# Patient Record
Sex: FEMALE | Race: WHITE | NOT HISPANIC OR LATINO
[De-identification: names, ages, dates, MRNs, and addresses within clinical notes are randomized per-mention and may not be internally consistent; named-entity substitution may affect disease eponyms.]

---

## 2021-11-24 PROBLEM — Z00.00 ENCOUNTER FOR PREVENTIVE HEALTH EXAMINATION: Status: ACTIVE | Noted: 2021-11-24

## 2021-11-29 ENCOUNTER — APPOINTMENT (OUTPATIENT)
Dept: HEART AND VASCULAR | Facility: CLINIC | Age: 66
End: 2021-11-29
Payer: MEDICARE

## 2021-11-29 ENCOUNTER — NON-APPOINTMENT (OUTPATIENT)
Age: 66
End: 2021-11-29

## 2021-11-29 VITALS
SYSTOLIC BLOOD PRESSURE: 150 MMHG | HEART RATE: 56 BPM | WEIGHT: 160 LBS | HEIGHT: 64 IN | RESPIRATION RATE: 12 BRPM | DIASTOLIC BLOOD PRESSURE: 90 MMHG | BODY MASS INDEX: 27.31 KG/M2

## 2021-11-29 DIAGNOSIS — R94.31 ABNORMAL ELECTROCARDIOGRAM [ECG] [EKG]: ICD-10-CM

## 2021-11-29 DIAGNOSIS — Z82.49 FAMILY HISTORY OF ISCHEMIC HEART DISEASE AND OTHER DISEASES OF THE CIRCULATORY SYSTEM: ICD-10-CM

## 2021-11-29 DIAGNOSIS — Z78.9 OTHER SPECIFIED HEALTH STATUS: ICD-10-CM

## 2021-11-29 DIAGNOSIS — Z86.39 PERSONAL HISTORY OF OTHER ENDOCRINE, NUTRITIONAL AND METABOLIC DISEASE: ICD-10-CM

## 2021-11-29 DIAGNOSIS — R09.89 OTHER SPECIFIED SYMPTOMS AND SIGNS INVOLVING THE CIRCULATORY AND RESPIRATORY SYSTEMS: ICD-10-CM

## 2021-11-29 PROCEDURE — 93306 TTE W/DOPPLER COMPLETE: CPT

## 2021-11-29 PROCEDURE — 99204 OFFICE O/P NEW MOD 45 MIN: CPT | Mod: 25

## 2021-11-29 PROCEDURE — 93000 ELECTROCARDIOGRAM COMPLETE: CPT

## 2021-11-29 PROCEDURE — 93880 EXTRACRANIAL BILAT STUDY: CPT

## 2021-11-29 PROCEDURE — ZZZZZ: CPT

## 2021-11-29 RX ORDER — AMOXICILLIN AND CLAVULANATE POTASSIUM 500; 125 MG/1; MG/1
500-125 TABLET, FILM COATED ORAL
Qty: 20 | Refills: 0 | Status: DISCONTINUED | COMMUNITY
Start: 2021-10-21

## 2021-11-29 RX ORDER — LEVOFLOXACIN 500 MG/1
500 TABLET, FILM COATED ORAL
Qty: 10 | Refills: 0 | Status: DISCONTINUED | COMMUNITY
Start: 2021-10-28

## 2021-11-29 RX ORDER — LEVOTHYROXINE SODIUM 25 UG/1
25 TABLET ORAL
Refills: 0 | Status: DISCONTINUED | COMMUNITY

## 2021-11-29 RX ORDER — PREDNISONE 10 MG/1
10 TABLET ORAL
Qty: 40 | Refills: 0 | Status: DISCONTINUED | COMMUNITY
Start: 2021-10-28

## 2021-11-29 RX ORDER — LEVOTHYROXINE SODIUM 0.12 MG/1
125 TABLET ORAL
Qty: 90 | Refills: 0 | Status: ACTIVE | COMMUNITY
Start: 2021-10-18

## 2021-11-29 NOTE — DISCUSSION/SUMMARY
[FreeTextEntry1] : 1. Chest discomfort/abnormal EKG: EKG reviewed normal sinus rhythm with LVH and strain pattern. However, will need to rule out ischemic heart disease and this middle-aged postmenopausal female with hypothyroidism wants a chest pain. Will advise with results of an exercise nuclear stress test known.\par 2. Cardiac murmur: Echocardiogram\par 3. Carotid bruit with carotid duplex\par 4. Hypertension: Hard to believe patient has white coat hypertension as her heart rate on no AV seng blockers is low/normal. Will start antihypertensive medication at this time advised to maintain a low salt diet weight loss.

## 2021-11-29 NOTE — HISTORY OF PRESENT ILLNESS
[FreeTextEntry1] : 66-year-old female with a past medical history hypothyroidism comes in for evaluation of chest discomfort. Patient reports a few weeks of substernal nonradiating chest discomfort most notable on exertional activity relieved with rest. Does also occasionally occur at rest. No associated shortness of breath PND orthopnea.

## 2021-11-29 NOTE — PHYSICAL EXAM

## 2021-12-09 ENCOUNTER — APPOINTMENT (OUTPATIENT)
Dept: HEART AND VASCULAR | Facility: CLINIC | Age: 66
End: 2021-12-09
Payer: MEDICARE

## 2021-12-09 DIAGNOSIS — R07.89 OTHER CHEST PAIN: ICD-10-CM

## 2021-12-09 PROCEDURE — 78452 HT MUSCLE IMAGE SPECT MULT: CPT

## 2021-12-09 PROCEDURE — A9500: CPT

## 2021-12-09 PROCEDURE — 93015 CV STRESS TEST SUPVJ I&R: CPT

## 2021-12-09 PROCEDURE — 96374 THER/PROPH/DIAG INJ IV PUSH: CPT | Mod: 59

## 2022-01-03 RX ORDER — LOSARTAN POTASSIUM 25 MG/1
25 TABLET, FILM COATED ORAL DAILY
Qty: 30 | Refills: 3 | Status: DISCONTINUED | COMMUNITY
Start: 2021-11-29 | End: 2022-01-03

## 2022-06-02 ENCOUNTER — APPOINTMENT (OUTPATIENT)
Dept: HEART AND VASCULAR | Facility: CLINIC | Age: 67
End: 2022-06-02

## 2022-06-14 ENCOUNTER — APPOINTMENT (OUTPATIENT)
Dept: HEART AND VASCULAR | Facility: CLINIC | Age: 67
End: 2022-06-14
Payer: MEDICARE

## 2022-06-14 ENCOUNTER — NON-APPOINTMENT (OUTPATIENT)
Age: 67
End: 2022-06-14

## 2022-06-14 VITALS
BODY MASS INDEX: 29.02 KG/M2 | HEIGHT: 64 IN | WEIGHT: 170 LBS | RESPIRATION RATE: 12 BRPM | SYSTOLIC BLOOD PRESSURE: 124 MMHG | DIASTOLIC BLOOD PRESSURE: 70 MMHG | HEART RATE: 60 BPM

## 2022-06-14 DIAGNOSIS — I47.2 VENTRICULAR TACHYCARDIA: ICD-10-CM

## 2022-06-14 PROCEDURE — 99214 OFFICE O/P EST MOD 30 MIN: CPT

## 2022-06-14 PROCEDURE — 93000 ELECTROCARDIOGRAM COMPLETE: CPT

## 2022-06-14 NOTE — PHYSICAL EXAM

## 2022-06-14 NOTE — DISCUSSION/SUMMARY
[FreeTextEntry1] : 1. RV OT-VT: Continue metoprolol. Will place a 24-hour Holter monitor to assess for VT if present will refer her for ablation.\par 2. Hypertension: Adequately controlled on telmisartan 20 mg daily along with metoprolol 25 mg daily advised to continue maintain a low salt diet.

## 2022-06-14 NOTE — REASON FOR VISIT
Lm on vm informing pt of appt and xray 1/8/20.   [FreeTextEntry1] : 67-year-old female with a past medical history of hypertension and RV OT-VT noted on an exercise nuclear stress test. Done 6 months ago. Since then patient has been feeling well denies any chest pain shortness of breath PND orthopnea lightheadedness syncope or palpitations.

## 2023-02-20 ENCOUNTER — APPOINTMENT (OUTPATIENT)
Dept: HEART AND VASCULAR | Facility: CLINIC | Age: 68
End: 2023-02-20

## 2023-02-24 ENCOUNTER — APPOINTMENT (OUTPATIENT)
Dept: HEART AND VASCULAR | Facility: CLINIC | Age: 68
End: 2023-02-24
Payer: MEDICARE

## 2023-02-24 ENCOUNTER — NON-APPOINTMENT (OUTPATIENT)
Age: 68
End: 2023-02-24

## 2023-02-24 VITALS
SYSTOLIC BLOOD PRESSURE: 130 MMHG | DIASTOLIC BLOOD PRESSURE: 80 MMHG | HEART RATE: 66 BPM | BODY MASS INDEX: 29.18 KG/M2 | RESPIRATION RATE: 12 BRPM | WEIGHT: 170 LBS

## 2023-02-24 DIAGNOSIS — R01.1 CARDIAC MURMUR, UNSPECIFIED: ICD-10-CM

## 2023-02-24 DIAGNOSIS — I10 ESSENTIAL (PRIMARY) HYPERTENSION: ICD-10-CM

## 2023-02-24 DIAGNOSIS — I42.0 DILATED CARDIOMYOPATHY: ICD-10-CM

## 2023-02-24 PROCEDURE — 99214 OFFICE O/P EST MOD 30 MIN: CPT | Mod: 25

## 2023-02-24 PROCEDURE — 93000 ELECTROCARDIOGRAM COMPLETE: CPT

## 2023-02-24 PROCEDURE — ZZZZZ: CPT

## 2023-02-24 NOTE — PHYSICAL EXAM

## 2023-02-24 NOTE — DISCUSSION/SUMMARY
[FreeTextEntry1] : 1.  Cardiomyopathy: Etiology either coronary vasospasm and or more likely myocarditis.  We will obtain an EKG repeat echocardiogram in 6 weeks to assess for LV function.  At this time continue metoprolol and telmisartan as currently dosed.\par 2.  Hypertension: See above\par 3.  Hypothyroidism: Continue Synthroid. [EKG obtained to assist in diagnosis and management of assessed problem(s)] : EKG obtained to assist in diagnosis and management of assessed problem(s)

## 2023-02-24 NOTE — HISTORY OF PRESENT ILLNESS
[FreeTextEntry1] : 67-year-old female with a past medical history of hypertension comes in for follow-up posthospitalization.  1 week ago patient was walking developed sudden onset shortness of breath gasping for air EMS was called she was taken to the emergency room was noted to have a slight bump in her cardiac enzymes patient was subsequently given Lasix troponins peaked at 0.33 she underwent a coronary angiogram which revealed nonobstructive CAD.  Reportedly echocardiogram revealed a global cardiomyopathy EF 40 to 45%.  Since discharge patient has been more or less back to her baseline still complaining of exertional fatigue.  Of note, patient underwent a nuclear stress test in December 2021 while on the treadmill developed what appears to be accelerated idioventricular rhythm/RV OT VT.  Currently denies any palpitations.

## 2023-03-15 ENCOUNTER — TRANSCRIPTION ENCOUNTER (OUTPATIENT)
Age: 68
End: 2023-03-15

## 2023-11-13 RX ORDER — METOPROLOL SUCCINATE 25 MG/1
25 TABLET, EXTENDED RELEASE ORAL
Qty: 90 | Refills: 1 | Status: ACTIVE | COMMUNITY
Start: 2021-12-10 | End: 1900-01-01

## 2023-12-11 RX ORDER — TELMISARTAN 20 MG/1
20 TABLET ORAL
Qty: 90 | Refills: 1 | Status: ACTIVE | COMMUNITY
Start: 2022-01-03 | End: 1900-01-01

## 2024-09-30 ENCOUNTER — APPOINTMENT (OUTPATIENT)
Dept: HEART AND VASCULAR | Facility: CLINIC | Age: 69
End: 2024-09-30
Payer: MEDICARE

## 2024-09-30 ENCOUNTER — NON-APPOINTMENT (OUTPATIENT)
Age: 69
End: 2024-09-30

## 2024-09-30 VITALS
HEIGHT: 64 IN | BODY MASS INDEX: 28.17 KG/M2 | DIASTOLIC BLOOD PRESSURE: 70 MMHG | RESPIRATION RATE: 12 BRPM | SYSTOLIC BLOOD PRESSURE: 150 MMHG | HEART RATE: 60 BPM | OXYGEN SATURATION: 99 % | WEIGHT: 165 LBS

## 2024-09-30 DIAGNOSIS — R09.89 OTHER SPECIFIED SYMPTOMS AND SIGNS INVOLVING THE CIRCULATORY AND RESPIRATORY SYSTEMS: ICD-10-CM

## 2024-09-30 DIAGNOSIS — I42.0 DILATED CARDIOMYOPATHY: ICD-10-CM

## 2024-09-30 DIAGNOSIS — I10 ESSENTIAL (PRIMARY) HYPERTENSION: ICD-10-CM

## 2024-09-30 PROCEDURE — G2211 COMPLEX E/M VISIT ADD ON: CPT

## 2024-09-30 PROCEDURE — 99214 OFFICE O/P EST MOD 30 MIN: CPT | Mod: 25

## 2024-09-30 PROCEDURE — 93306 TTE W/DOPPLER COMPLETE: CPT

## 2024-09-30 PROCEDURE — 93000 ELECTROCARDIOGRAM COMPLETE: CPT

## 2024-09-30 PROCEDURE — 93880 EXTRACRANIAL BILAT STUDY: CPT

## 2024-09-30 RX ORDER — DOXYCYCLINE HYCLATE 100 MG/1
100 CAPSULE ORAL
Qty: 28 | Refills: 0 | Status: COMPLETED | COMMUNITY
Start: 2024-08-14

## 2024-09-30 RX ORDER — GUAIFENESIN AND CODEINE PHOSPHATE 10; 100 MG/5ML; MG/5ML
100-10 SOLUTION ORAL
Qty: 200 | Refills: 0 | Status: COMPLETED | COMMUNITY
Start: 2024-08-14

## 2024-09-30 RX ORDER — AZITHROMYCIN 250 MG/1
250 TABLET, FILM COATED ORAL
Qty: 6 | Refills: 0 | Status: COMPLETED | COMMUNITY
Start: 2024-07-30

## 2024-09-30 RX ORDER — AMOXICILLIN AND CLAVULANATE POTASSIUM 875; 125 MG/1; MG/1
875-125 TABLET, COATED ORAL
Qty: 14 | Refills: 0 | Status: COMPLETED | COMMUNITY
Start: 2024-09-12

## 2024-09-30 RX ORDER — MONTELUKAST 10 MG/1
10 TABLET, FILM COATED ORAL
Qty: 90 | Refills: 0 | Status: ACTIVE | COMMUNITY
Start: 2024-08-28

## 2024-09-30 NOTE — DISCUSSION/SUMMARY
[FreeTextEntry1] : 1.  Hypertension: Adequately controlled on current medication advised continue 2.  Cardiac murmur: Echocardiogram 3.  Carotid bruit: Carotid duplex  reminded to f/u with pc for all routine medical matters. [EKG obtained to assist in diagnosis and management of assessed problem(s)] : EKG obtained to assist in diagnosis and management of assessed problem(s)

## 2024-09-30 NOTE — HISTORY OF PRESENT ILLNESS
[place], your cholesterol has increased in the last year. Still not in a zone needing meds, but need to be working on lifestyle changes to manage or we will need to talk about meds. F/u labs in 6-12 months.  Please let me know if you have any questions.    Shi Alonso MD     [FreeTextEntry1] : 61-year-old female with past medical history of dilated cardiomyopathy hypertension comes in for follow-up.  Patient under the care of pulmonary medicine for chronic shortness of breath.  Now currently has URI.  Denies any chest pain PND orthopnea.

## 2024-09-30 NOTE — PHYSICAL EXAM
